# Patient Record
Sex: MALE | Race: WHITE
[De-identification: names, ages, dates, MRNs, and addresses within clinical notes are randomized per-mention and may not be internally consistent; named-entity substitution may affect disease eponyms.]

---

## 2018-10-20 ENCOUNTER — HOSPITAL ENCOUNTER (EMERGENCY)
Dept: HOSPITAL 72 - EMR | Age: 33
Discharge: HOME | End: 2018-10-20
Payer: SELF-PAY

## 2018-10-20 VITALS — DIASTOLIC BLOOD PRESSURE: 85 MMHG | SYSTOLIC BLOOD PRESSURE: 125 MMHG

## 2018-10-20 VITALS — HEIGHT: 72 IN | WEIGHT: 180 LBS | BODY MASS INDEX: 24.38 KG/M2
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
## 2018-10-20 DIAGNOSIS — S60.472A: Primary | ICD-10-CM
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
## 2018-10-20 DIAGNOSIS — Z23: ICD-10-CM

## 2018-10-20 PROCEDURE — 90715 TDAP VACCINE 7 YRS/> IM: CPT

## 2018-10-20 PROCEDURE — 99282 EMERGENCY DEPT VISIT SF MDM: CPT

## 2018-10-20 PROCEDURE — 90471 IMMUNIZATION ADMIN: CPT

## 2018-10-20 NOTE — EMERGENCY ROOM REPORT
History of Present Illness


General


Chief Complaint:  Animal Bite





Present Illness


HPI


32 YO male presents to the ED C/O squirrel bite around 10 am this morning. pt. 

reports he was feeding the squirrel and was bite. He denies pain. He sates that 

it did not bleed. Pt. states he had his tetanus vaccination years ago when he 

was in school. pt. Denies allergies to medications. the bite was sustained on 

the right middle finger. pt. is right hand dominant. Pt. states he tried to 

clean the wound but only had perfume and he sprayed that because it has alcohol.


Allergies:  


Coded Allergies:  


     No Known Allergies (Unverified , 10/20/18)





Patient History


Past Medical History:  see triage record


Past Surgical History:  none


Pertinent Family History:  none


Reviewed Nursing Documentation:  PMH: Agreed; PSxH: Agreed





Review of Systems


All Other Systems:  negative except mentioned in HPI





Physical Exam





Vital Signs








  Date Time  Temp Pulse Resp B/P (MAP) Pulse Ox O2 Delivery O2 Flow Rate FiO2


 


10/20/18 12:10 98.4 70 16 125/85 95 Room Air  





 98.4       








Sp02 EP Interpretation:  reviewed, normal


General Appearance:  no apparent distress, alert, GCS 15, non-toxic


Head:  normocephalic, atraumatic


ENT:  hearing grossly normal, normal voice


Neck:  full range of motion


Respiratory:  normal breath sounds, speaking full sentences


Cardiovascular #1:  regular rate, rhythm, normal capillary refill


Musculoskeletal:  back normal, gait/station normal, normal range of motion, non-

tender


Neurologic:  alert, oriented x3, responsive, motor strength/tone normal, 

sensory intact, speech normal, grossly normal


Psychiatric:  judgement/insight normal


Skin:  normal color, no rash, warm/dry, well hydrated, other - small 

superficial break in the skin on the finger pad of the right middle finger, 

does not appear to extend through the entire dermis, no erythema, warmth, 

blisters, vessicles, blood or discharge. no obvious ST FB.





Medical Decision Making


PA Attestation


Dr. Haney is my supervising Physician whom patient management has been 

discussed with.


Diagnostic Impression:  


 Primary Impression:  


 Bitten by squirrel, initial encounter


ER Course


32 YO male presents to the ED C/O squirrel bite around 10 am this morning. pt. 

reports he was feeding the squirrel and was bite. He denies pain. He sates that 

it did not bleed. Pt. states he had his tetanus vaccination years ago when he 

was in school. pt. Denies allergies to medications. the bite was sustained on 

the right middle finger. pt. is right hand dominant. Pt. states he tried to 

clean the wound but only had perfume and he sprayed that because it has alcohol.


 


Ddx considered but are not limited to Cellulitis, rabies, fracture, 

neurovascular compromise of extremity. 





Vital signs: are WNL, pt. is afebrile 





H&PE are most consistent with  animal bite- superficial , did not penetrate the 

entire dermis. no evidence of infection at this time. 





ORDERS: none required at this time, the diagnosis is clinical 





ED INTERVENTIONS: 


-Tetanus vaccination is administered.


-Wound cleaning


-Bacitracin and sterile dressing was applied by the RN.


 


DISCHARGE: At this time pt. is stable for d/c to home. Will provide printed 

patient care instructions, and any necessary prescriptions. Care plan and 

follow up instructions have been discussed with the patient prior to discharge.





Last Vital Signs








  Date Time  Temp Pulse Resp B/P (MAP) Pulse Ox O2 Delivery O2 Flow Rate FiO2


 


10/20/18 12:10 98.4 70 16 125/85 95 Room Air  





 98.4       








Disposition:  HOME, SELF-CARE


Condition:  Stable


Patient Instructions:  Animal Bite





Additional Instructions:  


Take medications as directed. 





 ** Follow up with a Primary Care Provider in 3-5 days, even if your symptoms 

have resolved. ** 


--Please review list of primary care clinics, if you do not already have a 

primary care provider





Return sooner to ED if new symptoms occur, or current symptoms become worse. 











- Please note that this Emergency Department Report was dictated using [employer] technology software, occasionally this can lead to 

erroneous entry secondary to interpretation by the dictation equipment.











Suzy Lozano Oct 20, 2018 12:32